# Patient Record
Sex: FEMALE | Race: WHITE
[De-identification: names, ages, dates, MRNs, and addresses within clinical notes are randomized per-mention and may not be internally consistent; named-entity substitution may affect disease eponyms.]

---

## 2018-10-09 ENCOUNTER — HOSPITAL ENCOUNTER (INPATIENT)
Dept: HOSPITAL 74 - JLDR | Age: 43
LOS: 4 days | Discharge: HOME | DRG: 540 | End: 2018-10-13
Attending: OBSTETRICS & GYNECOLOGY | Admitting: OBSTETRICS & GYNECOLOGY
Payer: COMMERCIAL

## 2018-10-09 VITALS — BODY MASS INDEX: 37.4 KG/M2

## 2018-10-09 DIAGNOSIS — O34.211: Primary | ICD-10-CM

## 2018-10-09 DIAGNOSIS — Z3A.39: ICD-10-CM

## 2018-10-09 PROCEDURE — G0008 ADMIN INFLUENZA VIRUS VAC: HCPCS

## 2018-10-09 RX ADMIN — FERROUS SULFATE TAB EC 324 MG (65 MG FE EQUIVALENT) SCH: 324 (65 FE) TABLET DELAYED RESPONSE at 21:56

## 2018-10-09 RX ADMIN — Medication SCH MLS/HR: at 22:08

## 2018-10-09 RX ADMIN — Medication SCH MLS/HR: at 09:15

## 2018-10-09 RX ADMIN — Medication SCH MLS/HR: at 14:14

## 2018-10-09 RX ADMIN — FERROUS SULFATE TAB EC 324 MG (65 MG FE EQUIVALENT) SCH: 324 (65 FE) TABLET DELAYED RESPONSE at 10:28

## 2018-10-09 NOTE — HP
Admitting History and Physical





- Admission


Chief Complaint: prior cs for repeat


History of Present Illness: 





44 y/o with 2 prior cs for repeat, no issues prenatally, declines tubal 

ligation. Pt at [facility]


History Source: Patient


Limitations to Obtaining History: No Limitations





- Past Medical History


CNS: No: Alzheimer's, CVA, Dementia, Migraine, Multiple Sclerosis, Peripheral 

Neuropathy, Parkinson's, Seizure, Syncope, TIA, Vertigo, Other


Cardiovascular: No: AFIB, Aneurysm, Aortic Insufficiency, Aortic Stenosis, CAD, 

CHF, Deep Vein Thrombosis, HTN, Hyperlipdemia, MI, Mitral Insufficiency, Mitral 

Stenosis, Murmur, Pulmonary Hypertension, Other


Pulmonary: No: Asthma, Bronchitis, Cancer, COPD, O2 Dependent, Pneumonia, 

Previously Intubated, Pulmonary Embolus, Pulmonary Fibrosis, Sleep Apnea, Other


Gastrointestinal: No: Ascites, Cancer, Constipation, Crohn's Disease, 

Diverticulitis, Diverticulosis, Esophageal Varices, Gastritis, GERD, GI Bleed, 

Hemorrhoids, Hiatal Hernia, Inflamatory Bowel Disease, Irritable Bowel Disease, 

Pancreatitis, Peptic Ulcer Disease, Ulcerative Colitis, Other


Hepatobiliary: No: Cirrhosis, Cholelithiasis, Cholecystitis, Choledocholithiasis

, Hepatitis A, Hepatitis B, Hepatitis C, Other


Renal/: No: Renal Failure, Renal Inusuff, BPH, Cancer, Hematuria, Hemodialysis

, Neurogenic Bladder, Renal Calculi, UTI, Other


Reproductive: No: Ectopic Pregnancy, Endometriosis, Fibroids, PID, Polycystic 

Ovary Syndrome, Postmenopausal, Other


...: 3


...Para: 2


Heme/Onc: No: Anemia, B12 Deficiency, Bleeding Disorder, Cancer, Current 

Chemotherapy, Current Radiation Therapy, Hemochromatosis, Hypercoaguable State, 

Myeloproliferative Synd, Sickle Cell Disease, Sickle Cell Trait, 

Thrombocytopenia, Other


Infectious Disease: No: AIDS, C-Diff, Herpes Zoster, HIV, MRSA, STD's, 

Tuberculosis, VREF, Other


Psych: No: Addictions, Anxiety, Bipolar, Depression, Panic, Psychosis, 

Schizophrenia, Other


Musculoskeletal: No: Bursitis, Chronic low back pain, Hemiparesis, Hemiplegia, 

Osteoarthritis, Paraplegia, Other





- Smoking History


Smoking history: Never smoked


Have you smoked in the past 12 months: No





- Alcohol/Substance Use


Hx Alcohol Use: No





Home Medications





- Allergies


Allergies/Adverse Reactions: 


 Allergies











Allergy/AdvReac Type Severity Reaction Status Date / Time


 


No Known Drug Allergies Allergy   Verified 10/09/18 05:42














- Home Medications


Home Medications: 


Ambulatory Orders





Prenatal Vit 108/Iron/Folic AC [Prenatal One Tablet] 1 each PO DAILY 10/09/18 











Review of Systems





- Review of Systems


Constitutional: reports: No Symptoms


Eyes: reports: No Symptoms


HENT: reports: No Symptoms


Neck: reports: No Symptoms


Cardiovascular: reports: No Symptoms


Respiratory: reports: No Symptoms


Gastrointestinal: reports: No Symptoms


Genitourinary: reports: No Symptoms


Musculoskeletal: reports: No Symptoms


Integumentary: reports: No Symptoms


Neurological: reports: No Symptoms


Endocrine: reports: No Symptoms


Hematology/Lymphatic: reports: No Symptoms





Physical Examination


Vital Signs: 


 Vital Signs











Temperature  98.3 F   10/09/18 05:52


 


Pulse Rate  76   10/09/18 05:52


 


Respiratory Rate  20   10/09/18 05:52


 


Blood Pressure  143/66   10/09/18 05:52


 


O2 Sat by Pulse Oximetry (%)      











Constitutional: Yes: Well Nourished


Eyes: Yes: WNL


HENT: Yes: WNL


Neck: Yes: WNL


Cardiovascular: Yes: WNL


Respiratory: Yes: WNL


Gastrointestinal: Yes: WNL


...Rectal Exam: Yes: WNL


Renal/: Yes: WNL


Breast(s): Yes: WNL


Musculoskeletal: Yes: WNL


Extremities: Yes: WNL





Assessment/Plan





admit


labs


consents

## 2018-10-10 LAB
BASOPHILS # BLD: 0.4 % (ref 0–2)
DEPRECATED RDW RBC AUTO: 13.1 % (ref 11.6–15.6)
EOSINOPHIL # BLD: 0.1 % (ref 0–4.5)
HCT VFR BLD CALC: 40.7 % (ref 32.4–45.2)
HGB BLD-MCNC: 13.4 GM/DL (ref 10.7–15.3)
LYMPHOCYTES # BLD: 6.7 % (ref 8–40)
MCH RBC QN AUTO: 30.9 PG (ref 25.7–33.7)
MCHC RBC AUTO-ENTMCNC: 33 G/DL (ref 32–36)
MCV RBC: 93.4 FL (ref 80–96)
MONOCYTES # BLD AUTO: 6.4 % (ref 3.8–10.2)
NEUTROPHILS # BLD: 86.4 % (ref 42.8–82.8)
PLATELET # BLD AUTO: 154 K/MM3 (ref 134–434)
PMV BLD: 9.4 FL (ref 7.5–11.1)
RBC # BLD AUTO: 4.35 M/MM3 (ref 3.6–5.2)
WBC # BLD AUTO: 12.9 K/MM3 (ref 4–10)

## 2018-10-10 RX ADMIN — FERROUS SULFATE TAB EC 324 MG (65 MG FE EQUIVALENT) SCH: 324 (65 FE) TABLET DELAYED RESPONSE at 10:24

## 2018-10-10 RX ADMIN — FERROUS SULFATE TAB EC 324 MG (65 MG FE EQUIVALENT) SCH MG: 324 (65 FE) TABLET DELAYED RESPONSE at 21:11

## 2018-10-10 RX ADMIN — SIMETHICONE CHEW TAB 80 MG PRN MG: 80 TABLET ORAL at 21:11

## 2018-10-10 RX ADMIN — IBUPROFEN PRN MG: 600 TABLET, FILM COATED ORAL at 21:11

## 2018-10-10 NOTE — PN
Progress Note (short form)





- Note


Progress Note: 





Anesthesia POD#1


S/P  under Spinal and DM


VSS,no N/V,legs are strong,no pain.





Marii Hernandez MD.

## 2018-10-11 RX ADMIN — IBUPROFEN PRN MG: 600 TABLET, FILM COATED ORAL at 06:49

## 2018-10-11 RX ADMIN — FERROUS SULFATE TAB EC 324 MG (65 MG FE EQUIVALENT) SCH MG: 324 (65 FE) TABLET DELAYED RESPONSE at 21:26

## 2018-10-11 RX ADMIN — SIMETHICONE CHEW TAB 80 MG PRN MG: 80 TABLET ORAL at 06:47

## 2018-10-11 RX ADMIN — FERROUS SULFATE TAB EC 324 MG (65 MG FE EQUIVALENT) SCH MG: 324 (65 FE) TABLET DELAYED RESPONSE at 10:28

## 2018-10-11 RX ADMIN — ACETAMINOPHEN PRN MG: 325 TABLET ORAL at 06:47

## 2018-10-11 NOTE — PN
Post Partum Progress Note


Type of Delivery: Repeat C/S


Vital Signs: 


 Vital Signs











Temperature  98.7 F   10/10/18 22:00


 


Pulse Rate  75   10/10/18 22:00


 


Respiratory Rate  18   10/10/18 22:00


 


Blood Pressure  129/71   10/10/18 22:00


 


O2 Sat by Pulse Oximetry (%)  100   10/09/18 09:25











Uterus: Yes: Fundus Firm


Incision: Yes: Dressing dry and intact


Abdomen/GI: Yes: Abdomen soft


Lochia: Yes: Rubra


Extremities: Yes: Calves non-tender


Perineum: Yes: Intact


Activity: Ambulating





- Labs


Labs: 


 CBC











WBC  12.9 K/mm3 (4.0-10.0)  H  10/10/18  06:30    


 


RBC  4.35 M/mm3 (3.60-5.2)   10/10/18  06:30    


 


Hgb  13.4 GM/dL (10.7-15.3)   10/10/18  06:30    


 


Hct  40.7 % (32.4-45.2)   10/10/18  06:30    


 


MCV  93.4 fl (80-96)   10/10/18  06:30    


 


MCH  30.9 pg (25.7-33.7)   10/10/18  06:30    


 


MCHC  33.0 g/dl (32.0-36.0)   10/10/18  06:30    


 


RDW  13.1 % (11.6-15.6)   10/10/18  06:30    


 


Plt Count  154 K/MM3 (134-434)   10/10/18  06:30    


 


MPV  9.4 fl (7.5-11.1)   10/10/18  06:30    


 


Absolute Neuts (auto)  11.2 K/mm3 (1.5-8.0)  H  10/10/18  06:30    


 


Neutrophils %  86.4 % (42.8-82.8)  H  10/10/18  06:30    


 


Lymphocytes %  6.7 % (8-40)  L D 10/10/18  06:30    


 


Monocytes %  6.4 % (3.8-10.2)   10/10/18  06:30    


 


Eosinophils %  0.1 % (0-4.5)   10/10/18  06:30    


 


Basophils %  0.4 % (0-2.0)   10/10/18  06:30    


 


Nucleated RBC %  0 % (0-0)   10/10/18  06:30    














Assessment/Plan





42yo  s/p RLTCS, POD#2


-Routine PP care


-OOB, ambulate


-Po Pain control


-Labs reviewed, wnl


-Anticipate d/c to home by POD#4





KEYONNA De Anda MD

## 2018-10-12 LAB
BASOPHILS # BLD: 0.7 % (ref 0–2)
DEPRECATED RDW RBC AUTO: 13.4 % (ref 11.6–15.6)
EOSINOPHIL # BLD: 2.5 % (ref 0–4.5)
HCT VFR BLD CALC: 38.4 % (ref 32.4–45.2)
HGB BLD-MCNC: 12.7 GM/DL (ref 10.7–15.3)
LYMPHOCYTES # BLD: 17.6 % (ref 8–40)
MCH RBC QN AUTO: 30.7 PG (ref 25.7–33.7)
MCHC RBC AUTO-ENTMCNC: 33.1 G/DL (ref 32–36)
MCV RBC: 92.9 FL (ref 80–96)
MONOCYTES # BLD AUTO: 8.3 % (ref 3.8–10.2)
NEUTROPHILS # BLD: 70.9 % (ref 42.8–82.8)
PLATELET # BLD AUTO: 194 K/MM3 (ref 134–434)
PMV BLD: 9.1 FL (ref 7.5–11.1)
RBC # BLD AUTO: 4.13 M/MM3 (ref 3.6–5.2)
WBC # BLD AUTO: 7.6 K/MM3 (ref 4–10)

## 2018-10-12 RX ADMIN — FERROUS SULFATE TAB EC 324 MG (65 MG FE EQUIVALENT) SCH MG: 324 (65 FE) TABLET DELAYED RESPONSE at 21:59

## 2018-10-12 RX ADMIN — IBUPROFEN PRN MG: 600 TABLET, FILM COATED ORAL at 01:26

## 2018-10-12 RX ADMIN — FERROUS SULFATE TAB EC 324 MG (65 MG FE EQUIVALENT) SCH MG: 324 (65 FE) TABLET DELAYED RESPONSE at 09:52

## 2018-10-12 RX ADMIN — ACETAMINOPHEN PRN MG: 325 TABLET ORAL at 01:25

## 2018-10-13 VITALS — SYSTOLIC BLOOD PRESSURE: 130 MMHG | HEART RATE: 70 BPM | TEMPERATURE: 97.8 F | DIASTOLIC BLOOD PRESSURE: 77 MMHG

## 2018-10-13 RX ADMIN — FERROUS SULFATE TAB EC 324 MG (65 MG FE EQUIVALENT) SCH MG: 324 (65 FE) TABLET DELAYED RESPONSE at 09:15

## 2018-10-13 NOTE — PN
Post Partum Progress Note


Post Partum Day: 4


Type of Delivery: Repeat C/S


Vital Signs: 


 Vital Signs











Temperature  97.5 F L  10/12/18 22:00


 


Pulse Rate  64   10/12/18 22:00


 


Respiratory Rate  20   10/12/18 22:00


 


Blood Pressure  125/68   10/12/18 22:00


 


O2 Sat by Pulse Oximetry (%)  100   10/09/18 09:25











Uterus: Yes: Fundus Firm


Abdomen/GI: Yes: Abdomen soft


Lochia: Yes: Rubra


Lochia, amount: Small


Extremities: Yes: Calves non-tender


Perineum: Yes: Intact


Activity: Ambulating





- Labs


Labs: 


 CBC











WBC  7.6 K/mm3 (4.0-10.0)   10/12/18  07:00    


 


RBC  4.13 M/mm3 (3.60-5.2)   10/12/18  07:00    


 


Hgb  12.7 GM/dL (10.7-15.3)   10/12/18  07:00    


 


Hct  38.4 % (32.4-45.2)   10/12/18  07:00    


 


MCV  92.9 fl (80-96)   10/12/18  07:00    


 


MCH  30.7 pg (25.7-33.7)   10/12/18  07:00    


 


MCHC  33.1 g/dl (32.0-36.0)   10/12/18  07:00    


 


RDW  13.4 % (11.6-15.6)   10/12/18  07:00    


 


Plt Count  194 K/MM3 (134-434)  D 10/12/18  07:00    


 


MPV  9.1 fl (7.5-11.1)   10/12/18  07:00    


 


Absolute Neuts (auto)  5.4 K/mm3 (1.5-8.0)   10/12/18  07:00    


 


Neutrophils %  70.9 % (42.8-82.8)   10/12/18  07:00    


 


Lymphocytes %  17.6 % (8-40)  D 10/12/18  07:00    


 


Monocytes %  8.3 % (3.8-10.2)   10/12/18  07:00    


 


Eosinophils %  2.5 % (0-4.5)  D 10/12/18  07:00    


 


Basophils %  0.7 % (0-2.0)   10/12/18  07:00    


 


Nucleated RBC %  0 % (0-0)   10/12/18  07:00    














Assessment/Plan





as above


dc home today

## 2018-10-15 NOTE — PATH
Surgical Pathology Report



Patient Name:  CYNTHIA GÓMEZ

Accession #:  [id number]

Med. Rec. #:  [id number]                                                        

   /Age/Gender:  1975 (Age: 43) / F

Account:  [id number]                                                          

             Location: [facility] OBS/GYN

Taken:  10/9/2018

Received:  10/10/2018

Reported:  10/15/2018

Physicians:  Reji Hinson M.D.

  



Specimen(s) Received

 PLACENTA 





Clinical History

The , 39 weeks

Exposed to TB in the past







Final Diagnosis

PLACENTA:

THIRD TRIMESTER PLACENTA.

TRIVASCULAR CORD.

MEMBRANES WITH NO DIAGNOSTIC ABNORMALITIES.





***Electronically Signed***

Sam Astorga M.D.





Gross Description

The specimen is received fresh labeled placenta and is a 368 gram, 15.0 x 14.5 x

2.4 cm. placenta with attached membranes and umbilical cord.  The attached

membranes are tan, translucent with focal opacities and insert marginally.  The

umbilical cord measures 26 cm. in length and averages 1.1 cm. in diameter.  The

cord inserts eccentrically, 3 cm. to the nearest margin.  No true knots or

strictures are identified. Cut surface of the umbilical cord reveals 3 vessels.

The fetal surface is grey-blue with minimal fibrin deposition and appropriate

caliber vessels.  The maternal surface is red-brown with focal defects. 

Sectioning reveals red-brown, spongy parenchyma.  No lesions are identified. 

Representative sections are submitted in three cassettes as follows: 1- membrane

rolls and umbilical cord; 2-3- full thickness sections of placenta.

/10/12/2018



saudi/10/12/2018